# Patient Record
Sex: FEMALE | Race: BLACK OR AFRICAN AMERICAN
[De-identification: names, ages, dates, MRNs, and addresses within clinical notes are randomized per-mention and may not be internally consistent; named-entity substitution may affect disease eponyms.]

---

## 2018-12-10 ENCOUNTER — HOSPITAL ENCOUNTER (EMERGENCY)
Dept: HOSPITAL 72 - EMR | Age: 53
Discharge: HOME | End: 2018-12-10
Payer: COMMERCIAL

## 2018-12-10 VITALS — DIASTOLIC BLOOD PRESSURE: 78 MMHG | SYSTOLIC BLOOD PRESSURE: 116 MMHG

## 2018-12-10 VITALS — WEIGHT: 183 LBS | HEIGHT: 61 IN | BODY MASS INDEX: 34.55 KG/M2

## 2018-12-10 VITALS — DIASTOLIC BLOOD PRESSURE: 65 MMHG | SYSTOLIC BLOOD PRESSURE: 138 MMHG

## 2018-12-10 DIAGNOSIS — I10: ICD-10-CM

## 2018-12-10 DIAGNOSIS — S09.90XA: Primary | ICD-10-CM
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
## 2018-12-10 DIAGNOSIS — R55: ICD-10-CM
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
## 2018-12-10 DIAGNOSIS — Z88.6: ICD-10-CM

## 2018-12-10 LAB
ADD MANUAL DIFF: NO
ALBUMIN SERPL-MCNC: 3.4 G/DL (ref 3.4–5)
ALBUMIN/GLOB SERPL: 0.7 {RATIO} (ref 1–2.7)
ALP SERPL-CCNC: 111 U/L (ref 46–116)
ALT SERPL-CCNC: 17 U/L (ref 12–78)
ANION GAP SERPL CALC-SCNC: 8 MMOL/L (ref 5–15)
AST SERPL-CCNC: 5 U/L (ref 15–37)
BASOPHILS NFR BLD AUTO: 1.2 % (ref 0–2)
BILIRUB SERPL-MCNC: 0.5 MG/DL (ref 0.2–1)
BUN SERPL-MCNC: 15 MG/DL (ref 7–18)
CALCIUM SERPL-MCNC: 8.7 MG/DL (ref 8.5–10.1)
CHLORIDE SERPL-SCNC: 106 MMOL/L (ref 98–107)
CK MB SERPL-MCNC: 1.4 NG/ML (ref 0–3.6)
CK SERPL-CCNC: 198 U/L (ref 26–308)
CO2 SERPL-SCNC: 27 MMOL/L (ref 21–32)
CREAT SERPL-MCNC: 0.9 MG/DL (ref 0.55–1.3)
EOSINOPHIL NFR BLD AUTO: 2.2 % (ref 0–3)
ERYTHROCYTE [DISTWIDTH] IN BLOOD BY AUTOMATED COUNT: 11.6 % (ref 11.6–14.8)
GLOBULIN SER-MCNC: 4.6 G/DL
HCT VFR BLD CALC: 42.5 % (ref 37–47)
HGB BLD-MCNC: 14.1 G/DL (ref 12–16)
LYMPHOCYTES NFR BLD AUTO: 20.5 % (ref 20–45)
MCV RBC AUTO: 89 FL (ref 80–99)
MONOCYTES NFR BLD AUTO: 7.3 % (ref 1–10)
NEUTROPHILS NFR BLD AUTO: 68.8 % (ref 45–75)
PLATELET # BLD: 218 K/UL (ref 150–450)
POTASSIUM SERPL-SCNC: 3.6 MMOL/L (ref 3.5–5.1)
RBC # BLD AUTO: 4.76 M/UL (ref 4.2–5.4)
SODIUM SERPL-SCNC: 141 MMOL/L (ref 136–145)
WBC # BLD AUTO: 7.4 K/UL (ref 4.8–10.8)

## 2018-12-10 PROCEDURE — 71045 X-RAY EXAM CHEST 1 VIEW: CPT

## 2018-12-10 PROCEDURE — 80053 COMPREHEN METABOLIC PANEL: CPT

## 2018-12-10 PROCEDURE — 99284 EMERGENCY DEPT VISIT MOD MDM: CPT

## 2018-12-10 PROCEDURE — 85025 COMPLETE CBC W/AUTO DIFF WBC: CPT

## 2018-12-10 PROCEDURE — 70486 CT MAXILLOFACIAL W/O DYE: CPT

## 2018-12-10 PROCEDURE — 84484 ASSAY OF TROPONIN QUANT: CPT

## 2018-12-10 PROCEDURE — 82550 ASSAY OF CK (CPK): CPT

## 2018-12-10 PROCEDURE — 93005 ELECTROCARDIOGRAM TRACING: CPT

## 2018-12-10 PROCEDURE — 82553 CREATINE MB FRACTION: CPT

## 2018-12-10 PROCEDURE — 70450 CT HEAD/BRAIN W/O DYE: CPT

## 2018-12-10 PROCEDURE — 83880 ASSAY OF NATRIURETIC PEPTIDE: CPT

## 2018-12-10 NOTE — DIAGNOSTIC IMAGING REPORT
Indication: Facial pain and trauma

 

Technique: Continuous helical transaxial imaging of the maxillofacial structures

obtained without intravenous contrast administration. Coronal 2-D reformats were also

obtained. Study obtained in a Siemens sensation 64 slice CT.  Automatic Exposure

Control was utilized.

 

 

Total Dose length Product (DLP):  568.98 mGycm

 

CT Dose Index Volume (CTDIvol):   28.19 mGy

 

Comparison: None

 

Findings: There is no evidence of an acute fracture. Paranasal sinuses and mastoids

are clear. Soft tissues are unremarkable.

 

IMPRESSION:

 

Negative exam

 

 

 

The CT scanner at [facility] is accredited by the [employer]

[employer] and the scans are performed using dose optimization techniques as

appropriate to a performed exam including Automatic Exposure control.

## 2018-12-10 NOTE — DIAGNOSTIC IMAGING REPORT
Indication: Syncope

 

Technique: Contiguous 5 mm thick transaxial imaging of the head obtained in a Siemens

Sensation 64 slice CT scanner.  Soft tissue and bone windows generated.  Automatic

Exposure Control was utilized.

 

 

Total Dose length Product (DLP):  1298.61 mGycm

 

CT Dose Index Volume (CTDIvol):   70.38 mGy

 

Comparison: 9/12/2018

 

Findings: The size and configuration of the cortical sulci, basal cisterns, and

ventricles are within normal limits for age. There is no mass effect, midline shift,

or edema identified. There is no evidence of acute hemorrhage or abnormal intra-axial

or extra-axial fluid collections. The bones and soft tissues are unremarkable.

 

Impression: No mass effect, edema or acute bleed.

 

 

 

The CT scanner at [facility] is accredited by the [employer] of

Rad[employer] and the scans are performed using dose optimization techniques as

appropriate to a performed exam including Automatic Exposure control.

## 2018-12-10 NOTE — DIAGNOSTIC IMAGING REPORT
Indication: Dyspnea

 

Comparison:  9/12/2018

 

A single view chest radiograph was obtained.

 

Findings:

 

Cardiomediastinal appearance is within normal limits for age. The lungs are clear.

Pulmonary vascularity is appropriate. The diaphragmatic contour is smooth and

costophrenic angles are sharp. No pleural effusions are identified. The bones are

unremarkable.

 

Impression: No acute findings

## 2018-12-10 NOTE — EMERGENCY ROOM REPORT
History of Present Illness


General


Chief Complaint:  Syncope


Source:  Patient





Present Illness


HPI


53-year-old female resents ED for evaluation.  Patient states she passed out 

last night while walking to the bathroom and hit her head.  Notes pain to the 

back of her head.  States that she was feeling dizzy and nauseous just before 

she passed out.  Patient presenting with pain to the back of her head.  Dull, 7 

out of 10, nonradiating.  Denies neck pain.  Denies photophobia or blurry 

vision.  Denies nausea or vomiting.  Denies chest pain or shortness of breath.  

No other aggravating relieving factors.  Denies any other associated symptoms


Allergies:  


Coded Allergies:  


     CODEINE (Verified  Allergy, Unknown, 6/10/09)





Patient History


Past Medical History:  HTN


Past Surgical History:  none


Pertinent Family History:  none


Social History:  Denies: smoking, alcohol use, drug use


Pregnant Now:  No


Immunizations:  UTD


Reviewed Nursing Documentation:  PMH: Agreed; PSxH: Agreed





Nursing Documentation-PMH


Past Medical History:  No History, Except For


Hx Hypertension:  Yes





Review of Systems


All Other Systems:  negative except mentioned in HPI





Physical Exam





Vital Signs








  Date Time  Temp Pulse Resp B/P (MAP) Pulse Ox O2 Delivery O2 Flow Rate FiO2


 


12/10/18 10:02 97.9 81 18 116/78 99 Room Air  








Sp02 EP Interpretation:  reviewed, normal


General Appearance:  no apparent distress, alert, GCS 15, non-toxic


Head:  normocephalic, other - TTP behind L ear. no berrios sign. no hemotympanum


Eyes:  bilateral eye normal inspection, bilateral eye PERRL, bilateral eye EOMI


ENT:  hearing grossly normal, normal pharynx, no angioedema, normal voice


Neck:  full range of motion, no bony tend, supple/symm/no masses, tender lateral


Respiratory:  chest non-tender, lungs clear, normal breath sounds, speaking 

full sentences


Cardiovascular #1:  regular rate, rhythm, no edema


Gastrointestinal:  normal inspection


Rectal:  deferred


Genitourinary:  no CVA tenderness


Musculoskeletal:  back normal


Neurologic:  alert, oriented x3, responsive, motor strength/tone normal, 

sensory intact, speech normal


Psychiatric:  judgement/insight normal, memory normal, mood/affect normal, no 

suicidal/homicidal ideation


Skin:  normal color, no rash, warm/dry, well hydrated


Lymphatic:  normal inspection





Medical Decision Making


Diagnostic Impression:  


 Primary Impression:  


 Head injury


 Qualified Codes:  S09.90XA - Unspecified injury of head, initial encounter


 Additional Impression:  


 Syncope


 Qualified Codes:  R55 - Syncope and collapse


ER Course


Hospital Course 


53-year-old female presents ED s/p syncopal episode + fall. c/o headache





Differential diagnoses include: arrythmia, dehydration, intracranial bleed, 

seizure 





Clinical course


Patient placed on stretcher. on cardiac monitor. After initial history and 

physical I ordered labs, EKG, chest Xray, IVFs, CT Brain 





labs reviewed- no leukocytosis, Hb/Hct stable, electrolytes ok, troponins 

negative 


CT Brain/Facial Bones - unremarkable


Chest x-ray- no acute process 


EKG - NSR, no acute ischemic changes interpreted by me 








Discussed findings with patient.  Per SF syncope rules patient is at low risk 

for adverse cardiac event.  Can be safely discharged to home pending close 

outpatient follow-up





I.  I feel this is a highly complex case requiring extensive working including 

EKG/Rhythm strip, Xray/CT/US, Blood/urine lab work, repeat exams while in ED, 

and administration of strong opiates/narcotics for pain control, admission to 

hospital or close patient follow up.  





Diagnosis - syncope, head injury 





Stable and discharged to home.  Followup with PMD.  Return to ED if symptoms 

recur or worsen





Labs








Test


  12/10/18


10:40


 


White Blood Count


  7.4 K/UL


(4.8-10.8)


 


Red Blood Count


  4.76 M/UL


(4.20-5.40)


 


Hemoglobin


  14.1 G/DL


(12.0-16.0)


 


Hematocrit


  42.5 %


(37.0-47.0)


 


Mean Corpuscular Volume 89 FL (80-99) 


 


Mean Corpuscular Hemoglobin


  29.6 PG


(27.0-31.0)


 


Mean Corpuscular Hemoglobin


Concent 33.1 G/DL


(32.0-36.0)


 


Red Cell Distribution Width


  11.6 %


(11.6-14.8)


 


Platelet Count


  218 K/UL


(150-450)


 


Mean Platelet Volume


  8.1 FL


(6.5-10.1)


 


Neutrophils (%) (Auto)


  68.8 %


(45.0-75.0)


 


Lymphocytes (%) (Auto)


  20.5 %


(20.0-45.0)


 


Monocytes (%) (Auto)


  7.3 %


(1.0-10.0)


 


Eosinophils (%) (Auto)


  2.2 %


(0.0-3.0)


 


Basophils (%) (Auto)


  1.2 %


(0.0-2.0)


 


Sodium Level


  141 MMOL/L


(136-145)


 


Potassium Level


  3.6 MMOL/L


(3.5-5.1)


 


Chloride Level


  106 MMOL/L


()


 


Carbon Dioxide Level


  27 MMOL/L


(21-32)


 


Anion Gap


  8 mmol/L


(5-15)


 


Blood Urea Nitrogen


  15 mg/dL


(7-18)


 


Creatinine


  0.9 MG/DL


(0.55-1.30)


 


Estimat Glomerular Filtration


Rate > 60 mL/min


(>60)


 


Glucose Level


  124 MG/DL


()


 


Calcium Level


  8.7 MG/DL


(8.5-10.1)


 


Total Bilirubin


  0.5 MG/DL


(0.2-1.0)


 


Aspartate Amino Transf


(AST/SGOT) 5 U/L (15-37) 


 


 


Alanine Aminotransferase


(ALT/SGPT) 17 U/L (12-78) 


 


 


Alkaline Phosphatase


  111 U/L


()


 


Total Creatine Kinase


  198 U/L


()


 


Creatine Kinase MB


  1.4 NG/ML


(0.0-3.6)


 


Creatine Kinase MB Relative


Index 0.7 


 


 


Troponin I


  0.000 ng/mL


(0.000-0.056)


 


Pro-B-Type Natriuretic Peptide


  29 pg/mL


(0-125)


 


Total Protein


  8.0 G/DL


(6.4-8.2)


 


Albumin


  3.4 G/DL


(3.4-5.0)


 


Globulin 4.6 g/dL 


 


Albumin/Globulin Ratio 0.7 (1.0-2.7) 








EKG Diagnostic Results


Rate:  normal


Rhythm:  NSR


ST Segments:  no acute changes


ASA given to the pt in ED:  No





Rhythm Strip Diag. Results


EP Interpretation:  yes


Rhythm:  NSR, no PVC's, no ectopy





Chest X-Ray Diagnostic Results


Chest X-Ray Diagnostic Results :  


   Chest X-Ray Ordered:  Yes


   # of Views/Limited/Complete:  1 View


   Indication:  Other - syncope


   EP Interpretation:  Yes


   Interpretation:  no consolidation, no effusion, no pneumothorax, no acute 

cardiopulmonary disease


   Impression:  No acute disease


   Electronically Signed by:  Electronically signed by Hadley Radford MD





CT/MRI/US Diagnostic Results


CT/MRI/US Diagnostic Results :  


   Imaging Test Ordered:  CT Head


   Impression


no acute process





Last Vital Signs








  Date Time  Temp Pulse Resp B/P (MAP) Pulse Ox O2 Delivery O2 Flow Rate FiO2


 


12/10/18 12:20 97.9 68 18 138/65 100 Room Air  








Status:  improved


Disposition:  HOME, SELF-CARE


Condition:  Stable


Scripts


Lidocaine (Lidoderm) 1 Each Adh..patch


1 PATCH TOPIC DAILY, #7 PATCH 0 Refills


   Patch(es) may remain in place for up to 12 hours in any 24-hour


   period.


   Prov: Hadley Radford MD         12/10/18 


Ibuprofen* (MOTRIN*) 600 Mg Tablet


600 MG ORAL Q8H PRN for For Pain, #30 TAB 0 Refills


   Prov: Hadley Radford MD         12/10/18


Patient Instructions:  Syncope











Hadley Radford MD Dec 10, 2018 13:48

## 2018-12-11 NOTE — CARDIOLOGY REPORT
--------------- APPROVED REPORT --------------





EKG Measurement

Heart [id number]

[id number]

[id number]

[id number]

[id number]





Normal sinus rhythm

Septal infarct, age undetermined

Abnormal ECG